# Patient Record
Sex: MALE | Race: WHITE
[De-identification: names, ages, dates, MRNs, and addresses within clinical notes are randomized per-mention and may not be internally consistent; named-entity substitution may affect disease eponyms.]

---

## 2018-12-25 ENCOUNTER — HOSPITAL ENCOUNTER (EMERGENCY)
Dept: HOSPITAL 95 - ER | Age: 78
Discharge: HOME | End: 2018-12-25
Payer: COMMERCIAL

## 2018-12-25 VITALS — BODY MASS INDEX: 27.49 KG/M2 | HEIGHT: 65 IN | WEIGHT: 164.99 LBS

## 2018-12-25 DIAGNOSIS — Z88.0: ICD-10-CM

## 2018-12-25 DIAGNOSIS — K21.9: ICD-10-CM

## 2018-12-25 DIAGNOSIS — Z88.6: ICD-10-CM

## 2018-12-25 DIAGNOSIS — Z88.2: ICD-10-CM

## 2018-12-25 DIAGNOSIS — Z79.899: ICD-10-CM

## 2018-12-25 DIAGNOSIS — I48.91: ICD-10-CM

## 2018-12-25 DIAGNOSIS — Z86.73: ICD-10-CM

## 2018-12-25 DIAGNOSIS — K59.00: Primary | ICD-10-CM

## 2019-03-07 ENCOUNTER — HOSPITAL ENCOUNTER (OUTPATIENT)
Dept: HOSPITAL 95 - ORSCSDS | Age: 79
Discharge: HOME | End: 2019-03-07
Attending: STUDENT IN AN ORGANIZED HEALTH CARE EDUCATION/TRAINING PROGRAM
Payer: MEDICARE

## 2019-03-07 VITALS — HEIGHT: 65.98 IN | WEIGHT: 194.6 LBS | BODY MASS INDEX: 31.27 KG/M2

## 2019-03-07 DIAGNOSIS — Z79.01: ICD-10-CM

## 2019-03-07 DIAGNOSIS — J44.9: ICD-10-CM

## 2019-03-07 DIAGNOSIS — K21.0: Primary | ICD-10-CM

## 2019-03-07 DIAGNOSIS — G47.33: ICD-10-CM

## 2019-03-07 DIAGNOSIS — E03.9: ICD-10-CM

## 2019-03-07 DIAGNOSIS — I48.0: ICD-10-CM

## 2019-03-07 DIAGNOSIS — R13.10: ICD-10-CM

## 2019-03-07 DIAGNOSIS — I25.10: ICD-10-CM

## 2019-03-07 DIAGNOSIS — K29.70: ICD-10-CM

## 2019-03-07 DIAGNOSIS — K44.9: ICD-10-CM

## 2019-03-07 DIAGNOSIS — Z79.899: ICD-10-CM

## 2019-03-07 DIAGNOSIS — Z95.0: ICD-10-CM

## 2019-03-07 DIAGNOSIS — R15.0: ICD-10-CM

## 2019-03-07 DIAGNOSIS — I10: ICD-10-CM

## 2019-03-07 PROCEDURE — 0DB58ZX EXCISION OF ESOPHAGUS, VIA NATURAL OR ARTIFICIAL OPENING ENDOSCOPIC, DIAGNOSTIC: ICD-10-PCS | Performed by: STUDENT IN AN ORGANIZED HEALTH CARE EDUCATION/TRAINING PROGRAM

## 2019-03-07 PROCEDURE — 0DB68ZX EXCISION OF STOMACH, VIA NATURAL OR ARTIFICIAL OPENING ENDOSCOPIC, DIAGNOSTIC: ICD-10-PCS | Performed by: STUDENT IN AN ORGANIZED HEALTH CARE EDUCATION/TRAINING PROGRAM

## 2019-03-07 NOTE — NUR
03/07/19 1318 Radha Eubanks
PT. VERBALIZES THAT HIS THROAT HURTS WHEN HE SWALLOWS. PT. ALSO
VERBALIZES THAT HE HAS REFLUX & A HIATAL HERNIA.

## 2019-03-07 NOTE — NUR
03/07/19 1448 Johan Coronado
PT WHEELED TO BATHROOM TO VOID. PT VERY TALKATIVE, TELLING STORIES
ABOUT HIS TRAVELS AROUND THE WORLD. VS WNL. PT TOLERATED WATER DURING
RECOVERY.